# Patient Record
(demographics unavailable — no encounter records)

---

## 2025-06-09 NOTE — HISTORY OF PRESENT ILLNESS
[FreeTextEntry1] : 22 y/o G0 female presents for annual exam and evaluation of secondary amenorrhea. She reports last pap was in 2023.  Pt stopped OCP in October and hasn't had a period since. She denies being sexually active since stopping OCP. She originally started it when she was 17 for acne. She received HPV vaccine in past. Doing well, no complaints.  she recently got her Masters Degree.  she [place] in [place] [facility]  (Breast Ca 43), Mother has not done genetic testing  NKDA

## 2025-06-09 NOTE — PLAN
[FreeTextEntry1] :  23 year old female presents for annual exam.  -Pap -GCCT -Bloodwork as above -Consider genetic cancer testing age 25 if mother still not tested.  -RTO 1 year for annual

## 2025-06-09 NOTE — HISTORY OF PRESENT ILLNESS
[FreeTextEntry1] : 22 y/o G0 female presents for annual exam and evaluation of secondary amenorrhea. She reports last pap was in 2023.  Pt stopped OCP in October and hasn't had a period since. She denies being sexually active since stopping OCP. She originally started it when she was 17 for acne. She received HPV vaccine in past. Doing well, no complaints.  she recently got her Masters Degree.  [facility] [place] [facility]  (Breast Ca [facility]), Mother has not done genetic testing  NKDA

## 2025-06-09 NOTE — END OF VISIT
[FreeTextEntry3] : I, Asia Mallory, acted as a scribe on behalf of Dr. Asia Hong M.D. on 06/06/2025 .   All medical entries made by the scribe were at my Dr. Asia Hong M.D direction and personally dictated by me on  06/06/2025 . I have reviewed the chart and agree that the record accurately reflects my personal performance of the history, physical exam, assessment and plan. I have also personally directed, reviewed, and agreed with the chart.